# Patient Record
(demographics unavailable — no encounter records)

---

## 2025-01-17 NOTE — HISTORY OF PRESENT ILLNESS
[FreeTextEntry1] : Reason for consult: sensory disturbance  Initial hx 3/2024 Late 1/2024 - hyperacute onset of whole body (face and scalp and body) numbness as she was going to sleep, the went to sleep, then more prominent 2h later. Went to ED. Didn't feel needle pricks but was able to tell which arm was being touched. CTH was unrevealing. then resolved spontaneously over days.  2/18/2024 - woke up with whole body tingling/numbness though more concentrated in top half of body. then improved. then recurred again 1-2 weeks later but less severe and associated with brain fog. then resolved over days again. Saw Roz Bhatia at [place] [facility]. Had more blood work, MRI brain read as unrevealing, rEEG unrevealing. B12 and TSH were OK in 1/2024. With me in early 2024, had unrevealing MRI brain and C spine as well as unrevealing 48h EEG.   Subj interval:  Feels overall much better from original complaint. However, still has 1x/wk change in sensation when trying to fall asleep but "minor" and lasts for only "a couple minutes".   Over past 2-3 months, has developed HA, nearly constant 24/7, holocephalic, usually a 1/10 but can occasionally get to 5/10. Also with vertiginous feeling that is also pretty constant, a feeling of taking some time to adjust when turning her head and looking at new things.   Tried acupuncture recently and it helped a lot with vertigo.   ROS/Current Sx: HA and vertigo as above no BB dysfunction  PMHX: none  MEDS: none  ALL: nkda  SHx: rare tob, occ etoh, occ marijuana.   FHx: NC  Vitals: unremarkable  Exam:  AO3.  Normally conversant.  Follows commands, names, and repeats.  Good attention.  PERRL, VFF, EOMI, no nystagmus, face symmetric, TUP at midline.  Motor:                                                  R:                               L: Del                                           5                                5 Bi                                              5                               5 Tri                                            5                               5 Wrist Extensors                      5                               5 Finger abductors                    5                               5                                         5                               5   HF                                           5                               5 KE                                           5                               5 KF                                           5                               5 DF                                           5                               5 PF                                           5                               5  Tone                                       R                               L UE                                          0                                0  LE                                          0                                0  Sensory                                RUE                      LUE                 RLE                LLE      LT                                           +                            +                      +                   + Vib                                          +                            +                      +                   + JPS                                         +                            +                      +                   + PP                                         +                            +                      +                   + Temp                                     +                            +                      +                   +  Reflexes:                                              R                             L                             Biceps                                  2                             2 BR                                        2                             2 Triceps                                2                              2 Pat                                        2                           2  AJ                                        2                             2  TOES                                    F                            F  Coordination:                                              R                             L                        FTN                                       0                             0  TAHIR                                      0                            0 HTS                                      0                             0   Other                                                                             Gait: normal, can heel, toe, tandem                      Assistance: none   MRI brain 1/2024 - unremarkable ([facility])  MRI brain and cervical 3/2024 - stable.  amb EEG 7/2024 - read as unrevealing.   AP: 25yo w/ stereotyped episodes of whole body numbness/tingling that resolve spontaneously over days of unclear etiology that have improved dramatically as of 1/2025. Now with 2-3m of headaches and dizziness of unclear etiology. Neuro exam is normal.    all questions answered, education provided, management discussed at length,  - check new brain MRI to r/o 2ndary causes of HAs.  - referral to ophthalmology for vision check - nsaids prn HAs when they get to 4-5/10 - referral to Leila Koch for opinion re: atypical headache syndrome. - RTC after testing